# Patient Record
Sex: FEMALE | URBAN - METROPOLITAN AREA
[De-identification: names, ages, dates, MRNs, and addresses within clinical notes are randomized per-mention and may not be internally consistent; named-entity substitution may affect disease eponyms.]

---

## 2022-09-02 ENCOUNTER — OFFICE (OUTPATIENT)
Dept: URBAN - METROPOLITAN AREA CLINIC 19 | Facility: CLINIC | Age: 22
End: 2022-09-02

## 2022-09-02 VITALS
WEIGHT: 130.6 LBS | SYSTOLIC BLOOD PRESSURE: 103 MMHG | OXYGEN SATURATION: 100 % | HEIGHT: 62 IN | HEART RATE: 57 BPM | DIASTOLIC BLOOD PRESSURE: 47 MMHG

## 2022-09-02 DIAGNOSIS — K64.9 UNSPECIFIED HEMORRHOIDS: ICD-10-CM

## 2022-09-02 DIAGNOSIS — R19.4 CHANGE IN BOWEL HABIT: ICD-10-CM

## 2022-09-02 DIAGNOSIS — K92.1 MELENA: ICD-10-CM

## 2022-09-02 LAB
C-REACTIVE PROTEIN, QUANT: <1 MG/L
INFLAMMATORY BOWEL DISEASE-IBD: ATYPICAL PANCA: (no result) TITER
INFLAMMATORY BOWEL DISEASE-IBD: SACCHAROMYCES CEREVISIAE, IGA: <20 UNITS
INFLAMMATORY BOWEL DISEASE-IBD: SACCHAROMYCES CEREVISIAE, IGG: <20 UNITS

## 2022-09-02 PROCEDURE — 99204 OFFICE O/P NEW MOD 45 MIN: CPT

## 2022-09-02 RX ORDER — HYDROCORTISONE ACETATE 25 MG/1
SUPPOSITORY RECTAL
Qty: 20 | Refills: 1 | Status: ACTIVE
Start: 2022-09-02

## 2022-09-02 RX ORDER — HYDROCORTISONE 25 MG/G
CREAM TOPICAL
Qty: 30 | Refills: 1 | Status: ACTIVE
Start: 2022-09-02

## 2022-09-02 NOTE — SERVICENOTES
Last October started- last known spotting on TP 2-3 days. BM increased 1-2 to 3.. Consider flex/sig LO if worsens

## 2022-09-02 NOTE — SERVICEHPINOTES
Ms. Hudson is a 22-year-old female here with complaints of hematochezia, possible hemorrhoids, and changes in bowel pattern. The patient reports that beginning last October she noted she was having more frequent bowel movements from approximately one to two daily to two to 3 daily. She also noted that she began having scant bleeding on the toilet paper 2-3 days weekly after a bowel movement. She reports that she did begin rock climbing at this time on a regular basis as well as working out more frequently. She denies any pain with a bowel movement. She did have a recent CBC which was normal. She states that it is bright red blood that she sees and it is only on the toilet tissue. She states that she does believe she has hemorrhoids but has never had anything protruding. She denies any family history of inflammatory bowel disease, abdominal pain, or unintentional weight loss.